# Patient Record
Sex: MALE | Race: WHITE | Employment: UNEMPLOYED | URBAN - METROPOLITAN AREA
[De-identification: names, ages, dates, MRNs, and addresses within clinical notes are randomized per-mention and may not be internally consistent; named-entity substitution may affect disease eponyms.]

---

## 2020-06-19 ENCOUNTER — HOSPITAL ENCOUNTER (EMERGENCY)
Age: 50
Discharge: HOME | End: 2020-06-19
Attending: EMERGENCY MEDICINE
Payer: COMMERCIAL

## 2020-06-19 ENCOUNTER — APPOINTMENT (OUTPATIENT)
Dept: GENERAL RADIOLOGY | Age: 50
End: 2020-06-19
Payer: COMMERCIAL

## 2020-06-19 VITALS
OXYGEN SATURATION: 97 % | HEIGHT: 68 IN | WEIGHT: 160 LBS | SYSTOLIC BLOOD PRESSURE: 120 MMHG | HEART RATE: 92 BPM | RESPIRATION RATE: 16 BRPM | BODY MASS INDEX: 24.25 KG/M2 | TEMPERATURE: 98.2 F | DIASTOLIC BLOOD PRESSURE: 75 MMHG

## 2020-06-19 PROCEDURE — 73080 X-RAY EXAM OF ELBOW: CPT

## 2020-06-19 PROCEDURE — 73030 X-RAY EXAM OF SHOULDER: CPT

## 2020-06-19 PROCEDURE — 96372 THER/PROPH/DIAG INJ SC/IM: CPT

## 2020-06-19 PROCEDURE — 6360000002 HC RX W HCPCS: Performed by: EMERGENCY MEDICINE

## 2020-06-19 PROCEDURE — 99282 EMERGENCY DEPT VISIT SF MDM: CPT

## 2020-06-19 PROCEDURE — 73090 X-RAY EXAM OF FOREARM: CPT

## 2020-06-19 RX ORDER — PREDNISONE 10 MG/1
TABLET ORAL
Qty: 21 TABLET | Refills: 0 | Status: SHIPPED | OUTPATIENT
Start: 2020-06-19 | End: 2020-06-29

## 2020-06-19 RX ORDER — DEXAMETHASONE SODIUM PHOSPHATE 4 MG/ML
10 INJECTION, SOLUTION INTRA-ARTICULAR; INTRALESIONAL; INTRAMUSCULAR; INTRAVENOUS; SOFT TISSUE ONCE
Status: COMPLETED | OUTPATIENT
Start: 2020-06-19 | End: 2020-06-19

## 2020-06-19 RX ADMIN — DEXAMETHASONE SODIUM PHOSPHATE 10 MG: 4 INJECTION, SOLUTION INTRAMUSCULAR; INTRAVENOUS at 15:22

## 2020-06-19 ASSESSMENT — PAIN SCALES - GENERAL: PAINLEVEL_OUTOF10: 3

## 2020-06-19 NOTE — ED PROVIDER NOTES
distress  Cardiovascular:  Regular rate. Regular rhythm. No murmurs, gallops, or rubs. 2+ distal pulses  Chest: no chest wall tenderness  Abdomen: Soft. Non tender. Non distended. +BS. No rebound, guarding, or rigidity. No pulsatile masses appreciated. Musculoskeletal: Moves all extremities x 4. Warm and well perfused, no clubbing, cyanosis, or edema. Capillary refill <3 seconds tenderness of the left lateral epicondyle of the elbow. No joint effusion full range of motion left shoulder has full range of motion but there is pain with abduction and abduction is also tenderness of the left forearm  Skin: warm and dry. Generalized rash. It is linear vesicular and erythematous consistent with poison ivy  Neurologic: GCS 15, CN 2-12 grossly intact, no focal deficits, symmetric strength 5/5 in the upper and lower extremities bilaterally  Psych: Normal Affect    -------------------------------------------------- RESULTS -------------------------------------------------  I have personally reviewed all laboratory and imaging results for this patient. Results are listed below. LABS:  No results found for this visit on 06/19/20. RADIOLOGY:  Interpreted by Radiologist.  XR SHOULDER LEFT (MIN 2 VIEWS)    (Results Pending)   XR ELBOW LEFT (MIN 3 VIEWS)    (Results Pending)   XR RADIUS ULNA LEFT (2 VIEWS)    (Results Pending)             ------------------------- NURSING NOTES AND VITALS REVIEWED ---------------------------   The nursing notes within the ED encounter and vital signs as below have been reviewed by myself. /75   Pulse 92   Temp 98.2 °F (36.8 °C) (Oral)   Resp 16   Ht 5' 8\" (1.727 m)   Wt 160 lb (72.6 kg)   SpO2 97%   BMI 24.33 kg/m²   Oxygen Saturation Interpretation: Normal    The patients available past medical records and past encounters were reviewed.         ------------------------------ ED COURSE/MEDICAL DECISION MAKING----------------------  Medications   dexamethasone (DECADRON)

## 2022-12-10 ENCOUNTER — HOSPITAL ENCOUNTER (EMERGENCY)
Age: 52
Discharge: HOME | End: 2022-12-10
Attending: EMERGENCY MEDICINE
Payer: COMMERCIAL

## 2022-12-10 VITALS
BODY MASS INDEX: 24.25 KG/M2 | SYSTOLIC BLOOD PRESSURE: 124 MMHG | HEIGHT: 68 IN | TEMPERATURE: 98.4 F | OXYGEN SATURATION: 97 % | HEART RATE: 75 BPM | RESPIRATION RATE: 16 BRPM | DIASTOLIC BLOOD PRESSURE: 75 MMHG | WEIGHT: 160 LBS

## 2022-12-10 DIAGNOSIS — S66.911A STRAIN OF BOTH WRISTS, INITIAL ENCOUNTER: ICD-10-CM

## 2022-12-10 DIAGNOSIS — Z00.8 MEDICAL CLEARANCE FOR INCARCERATION: ICD-10-CM

## 2022-12-10 DIAGNOSIS — R07.89 ATYPICAL CHEST PAIN: Primary | ICD-10-CM

## 2022-12-10 DIAGNOSIS — S66.912A STRAIN OF BOTH WRISTS, INITIAL ENCOUNTER: ICD-10-CM

## 2022-12-10 PROCEDURE — 99283 EMERGENCY DEPT VISIT LOW MDM: CPT

## 2022-12-10 PROCEDURE — 93005 ELECTROCARDIOGRAM TRACING: CPT | Performed by: EMERGENCY MEDICINE

## 2022-12-10 RX ORDER — NICOTINE 21 MG/24HR
21 PATCH, TRANSDERMAL 24 HOURS TRANSDERMAL EVERY 24 HOURS
COMMUNITY
Start: 2022-07-06

## 2022-12-10 RX ORDER — DIVALPROEX SODIUM 500 MG/1
1500 TABLET, DELAYED RELEASE ORAL
COMMUNITY
Start: 2015-07-06 | End: 2022-12-10

## 2022-12-10 RX ORDER — DIPHENHYDRAMINE HCL 25 MG
25 CAPSULE ORAL EVERY 6 HOURS PRN
COMMUNITY

## 2022-12-10 RX ORDER — DIVALPROEX SODIUM 250 MG/1
750 TABLET, EXTENDED RELEASE ORAL DAILY
Qty: 30 TABLET | Refills: 0
Start: 2022-12-10

## 2022-12-10 RX ORDER — CYCLOBENZAPRINE HCL 10 MG
10 TABLET ORAL EVERY 8 HOURS PRN
COMMUNITY
Start: 2019-04-09

## 2022-12-10 RX ORDER — CHLORHEXIDINE GLUCONATE 4 G/100ML
SOLUTION TOPICAL
COMMUNITY
Start: 2022-09-16 | End: 2022-12-10

## 2022-12-10 RX ORDER — OMEPRAZOLE 20 MG/1
20 CAPSULE, DELAYED RELEASE ORAL DAILY
COMMUNITY
Start: 2021-05-21

## 2022-12-10 RX ORDER — DIVALPROEX SODIUM 500 MG/1
1500 TABLET, EXTENDED RELEASE ORAL DAILY
COMMUNITY
Start: 2022-11-16 | End: 2022-12-10 | Stop reason: SDUPTHER

## 2022-12-10 RX ORDER — NALTREXONE HYDROCHLORIDE 50 MG/1
50 TABLET, FILM COATED ORAL DAILY
COMMUNITY
Start: 2022-07-06

## 2022-12-10 RX ORDER — DIVALPROEX SODIUM 250 MG/1
500 TABLET, DELAYED RELEASE ORAL 2 TIMES DAILY
COMMUNITY
Start: 2022-09-25 | End: 2022-12-10

## 2022-12-10 RX ORDER — BUPROPION HYDROCHLORIDE 150 MG/1
150 TABLET ORAL DAILY
COMMUNITY
Start: 2022-05-02

## 2022-12-10 RX ORDER — AMOXICILLIN 500 MG/1
CAPSULE ORAL
COMMUNITY
Start: 2021-06-05 | End: 2022-12-10

## 2022-12-10 RX ORDER — ATOMOXETINE 40 MG/1
40 CAPSULE ORAL DAILY
COMMUNITY
Start: 2022-11-16

## 2022-12-10 RX ORDER — AMMONIUM LACTATE 12 G/100G
LOTION TOPICAL
COMMUNITY
Start: 2021-05-21

## 2022-12-10 RX ORDER — IBUPROFEN 800 MG/1
800 TABLET ORAL EVERY 8 HOURS PRN
COMMUNITY
Start: 2016-07-14

## 2022-12-10 RX ORDER — BENZONATATE 100 MG/1
200 CAPSULE ORAL 3 TIMES DAILY PRN
COMMUNITY
Start: 2020-01-08 | End: 2022-12-10

## 2022-12-10 RX ORDER — NAPROXEN 500 MG/1
TABLET ORAL
COMMUNITY
Start: 2021-06-13 | End: 2022-12-10

## 2022-12-10 RX ORDER — ALBUTEROL SULFATE 90 UG/1
2 AEROSOL, METERED RESPIRATORY (INHALATION) EVERY 4 HOURS PRN
COMMUNITY
Start: 2020-01-08

## 2022-12-10 RX ORDER — NALOXONE HCL 8 MG/.1ML
8 SPRAY NASAL PRN
COMMUNITY
Start: 2022-04-07 | End: 2023-04-07

## 2022-12-10 RX ORDER — CHOLECALCIFEROL (VITAMIN D3) 125 MCG
5 CAPSULE ORAL NIGHTLY
COMMUNITY
Start: 2021-05-21

## 2022-12-10 RX ORDER — LANOLIN/MINERAL OIL/PETROLATUM
0.5 OINTMENT (GRAM) OPHTHALMIC (EYE) NIGHTLY
COMMUNITY
Start: 2022-08-31

## 2022-12-10 RX ORDER — BUPROPION HYDROCHLORIDE 150 MG/1
150 TABLET ORAL EVERY MORNING
COMMUNITY
Start: 2021-08-02 | End: 2022-12-10

## 2022-12-10 ASSESSMENT — PAIN - FUNCTIONAL ASSESSMENT
PAIN_FUNCTIONAL_ASSESSMENT: ACTIVITIES ARE NOT PREVENTED
PAIN_FUNCTIONAL_ASSESSMENT: 0-10

## 2022-12-10 ASSESSMENT — PAIN DESCRIPTION - ORIENTATION: ORIENTATION: LEFT

## 2022-12-10 ASSESSMENT — LIFESTYLE VARIABLES
HOW OFTEN DO YOU HAVE A DRINK CONTAINING ALCOHOL: MONTHLY OR LESS
HOW MANY STANDARD DRINKS CONTAINING ALCOHOL DO YOU HAVE ON A TYPICAL DAY: 3 OR 4

## 2022-12-10 ASSESSMENT — PAIN DESCRIPTION - LOCATION: LOCATION: CHEST

## 2022-12-10 ASSESSMENT — PAIN DESCRIPTION - DESCRIPTORS: DESCRIPTORS: STABBING

## 2022-12-11 LAB
EKG ATRIAL RATE: 66 BPM
EKG P AXIS: 68 DEGREES
EKG P-R INTERVAL: 168 MS
EKG Q-T INTERVAL: 396 MS
EKG QRS DURATION: 88 MS
EKG QTC CALCULATION (BAZETT): 415 MS
EKG R AXIS: 68 DEGREES
EKG T AXIS: 67 DEGREES
EKG VENTRICULAR RATE: 66 BPM

## 2022-12-11 PROCEDURE — 93010 ELECTROCARDIOGRAM REPORT: CPT | Performed by: INTERNAL MEDICINE

## 2022-12-11 NOTE — ED PROVIDER NOTES
[facility] [id number]  Phone: Nazanin Wynne 336 ENCOUNTER      CHIEF COMPLAINT    Chest pain, wrist pain    HPI    Wallace Rodriguez is a 47 y.o. male who presents with noted complaint. Patient has chest discomfort. Its come and gone. He is brought in by the police. He was caught stealing according to the  they had to simran him and handcuffed him. He had pain and discomfort in his wrist afterwards and they loosened him. He also chest pain that came and went. He denies other symptoms at this time such as nausea vomiting diaphoresis abdominal pain or other issues. PAST MEDICAL HISTORY    History reviewed. No pertinent past medical history. SURGICAL HISTORY    History reviewed. No pertinent surgical history. CURRENT MEDICATIONS        ALLERGIES    No Known Allergies    FAMILY HISTORY    History reviewed. No pertinent family history. SOCIAL HISTORY    Social History     Socioeconomic History    Marital status: Single     Spouse name: None    Number of children: None    Years of education: None    Highest education level: None   Tobacco Use    Smoking status: Every Day     Packs/day: 1.00     Types: Cigarettes    Smokeless tobacco: Never       REVIEW OF SYSTEMS    Positive for chest pain resolved. Positive for wrist pain from the handcuffs. Weakness laceration or other injuries  All systems negative except as marked. PHYSICAL EXAM    VITAL SIGNS: /75   Pulse 75   Temp 98.4 °F (36.9 °C) (Oral)   Resp 16   Ht 5' 8\" (1.727 m)   Wt 160 lb (72.6 kg)   SpO2 97%   BMI 24.33 kg/m²    Constitutional:  Alert not toxic or ill,   HENT:  normocephalic, Atraumatic,  Bilateral external ears normal, Oropharynx moist, No oral exudates, Nose normal.  Cervical Spine: Normal range of motion,  No stridor.  No tenderness, Supple,  Eyes:  No discharge or  Swelling,Conjunctiva normal, PERRL, EOMI,  Respiratory: No respiratory distress, Normal breath sounds,  No wheezing, No chest tenderness. Cardiovascular:  Normal heart rate, Normal rhythm, No murmurs, No rubs, No gallops. GI:  No reproducible pain, Bowel sounds normal, Soft, No masses, No pulsatile masses. No tenderness  Musculoskeletal:  Intact distal pulses, some circumferential wrist discomfort bilateral wrist without erythema induration laceration or other trauma. Back:No tenderness. Integument:  Warm, Dry, No erythema, No rash (on exposed areas)   Lymphatic:  No lymphadenopathy noted. Neurologic:  Alert & oriented x 3, Normal motor function, Normal sensory function, No focal deficits noted. Psychiatric:  Affect normal, Judgment normal, Mood normal.     EKG    Sinus rate and rhythm without ischemia or infarction                       RADIOLOGY    No orders to display       PROCEDURES    none      CONSULTS:  None patient reported chest pain has come and gone. His exam here is benign. Blood pressure is normal.  Denies alcohol or drug use at this time although review of his chart he has history of such. He was caught by police and active robbery. He wants to file police brutality. Counseled him in regards to his care at this time. He shows no signs or symptoms of severe deformity infection vascular insufficiency other problems in regards to his wrist.  Recommend ice to the areas for comfort. In regards to his chest pain sounds atypical is coming gone. Although he is 47. Check an EKG. He will need close follow-up with the medical provider at [facility]. CRITICAL CARE:  None    SCREENINGS:  /75   Pulse 75   Temp 98.4 °F (36.9 °C) (Oral)   Resp 16   Ht 5' 8\" (1.727 m)   Wt 160 lb (72.6 kg)   SpO2 97%   BMI 24.33 kg/m²     Screening For Hypertension and Follow-up (#317)   previously diagnosed with hypertension and not applicable for screen      Screening For Tobacco Use and Cessation Intervention (#226):   reports that he has been smoking.  He has been smoking an average of 1 pack per day. He has never used smokeless tobacco.  Tobacco cessation encouraged with brief counseling. Written home care instructions for smoking cessation provided. ED COURSE & MEDICAL DECISION MAKING    Pertinent Labs & Imaging studies reviewed. (See chart for details)  Chest pains come and gone. Neurovascular exam is otherwise unremarkable. He is complaining of bilateral wrist pain and discomfort after being handcuffed. There is no obvious severe bruising of there is some slight swelling. I am not suspicious for compartment syndrome, fractures vascular insufficiency or other issues. He states he wanted to report police brutality. Officer here is spoken to his superiors at this time. There is no other interventions at this time. In regards to his chest pain is fleeting came and gone. Counseled in regards to his care and need for follow-up. We will clear him for evaluation at [facility]. FINAL IMPRESSION    1. Atypical chest pain    2. Strain of both wrists, initial encounter    3.  Medical clearance for incarceration         PATIENT REFERRED TO:  Correctional medicine team      Follow up from ER condition    DISCHARGE MEDICATIONS:  Current Discharge Medication List             Shira Salazar MD  12/10/22 1218

## 2022-12-11 NOTE — DISCHARGE INSTRUCTIONS
Use Tylenol or Motrin or other over-the-counter analgesics approved by medical team at facility. May use ice to any swollen areas today if approved by medical team at corrections facility. monitor for shortness of breath worsening pain or problems. The corrections team will verify all your medications and prescriptions.